# Patient Record
Sex: FEMALE | Race: WHITE | NOT HISPANIC OR LATINO
[De-identification: names, ages, dates, MRNs, and addresses within clinical notes are randomized per-mention and may not be internally consistent; named-entity substitution may affect disease eponyms.]

---

## 2019-06-10 ENCOUNTER — RECORD ABSTRACTING (OUTPATIENT)
Age: 54
End: 2019-06-10

## 2019-06-10 DIAGNOSIS — Z80.52 FAMILY HISTORY OF MALIGNANT NEOPLASM OF BLADDER: ICD-10-CM

## 2019-06-10 DIAGNOSIS — Z78.9 OTHER SPECIFIED HEALTH STATUS: ICD-10-CM

## 2019-06-10 DIAGNOSIS — Z85.528 PERSONAL HISTORY OF OTHER MALIGNANT NEOPLASM OF KIDNEY: ICD-10-CM

## 2019-06-10 DIAGNOSIS — Z83.3 FAMILY HISTORY OF DIABETES MELLITUS: ICD-10-CM

## 2019-06-10 DIAGNOSIS — E04.9 NONTOXIC GOITER, UNSPECIFIED: ICD-10-CM

## 2019-06-10 DIAGNOSIS — Z86.39 PERSONAL HISTORY OF OTHER ENDOCRINE, NUTRITIONAL AND METABOLIC DISEASE: ICD-10-CM

## 2019-06-10 DIAGNOSIS — R63.5 ABNORMAL WEIGHT GAIN: ICD-10-CM

## 2019-06-10 PROBLEM — Z00.00 ENCOUNTER FOR PREVENTIVE HEALTH EXAMINATION: Status: ACTIVE | Noted: 2019-06-10

## 2019-06-10 RX ORDER — LEVOTHYROXINE SODIUM 112 UG/1
112 TABLET ORAL DAILY
Refills: 0 | Status: ACTIVE | COMMUNITY

## 2019-07-22 ENCOUNTER — APPOINTMENT (OUTPATIENT)
Dept: NEPHROLOGY | Facility: CLINIC | Age: 54
End: 2019-07-22
Payer: COMMERCIAL

## 2019-07-22 VITALS
RESPIRATION RATE: 16 BRPM | HEART RATE: 96 BPM | HEIGHT: 65 IN | BODY MASS INDEX: 26.33 KG/M2 | WEIGHT: 158 LBS | SYSTOLIC BLOOD PRESSURE: 118 MMHG | DIASTOLIC BLOOD PRESSURE: 70 MMHG | OXYGEN SATURATION: 98 %

## 2019-07-22 DIAGNOSIS — N18.1 CHRONIC KIDNEY DISEASE, STAGE 1: ICD-10-CM

## 2019-07-22 DIAGNOSIS — E78.5 HYPERLIPIDEMIA, UNSPECIFIED: ICD-10-CM

## 2019-07-22 DIAGNOSIS — E03.9 HYPOTHYROIDISM, UNSPECIFIED: ICD-10-CM

## 2019-07-22 DIAGNOSIS — E55.9 VITAMIN D DEFICIENCY, UNSPECIFIED: ICD-10-CM

## 2019-07-22 DIAGNOSIS — E66.9 OBESITY, UNSPECIFIED: ICD-10-CM

## 2019-07-22 PROCEDURE — 99214 OFFICE O/P EST MOD 30 MIN: CPT

## 2019-07-22 RX ORDER — ERGOCALCIFEROL 1.25 MG/1
1.25 MG CAPSULE, LIQUID FILLED ORAL
Refills: 0 | Status: DISCONTINUED | COMMUNITY
End: 2019-07-22

## 2019-07-22 NOTE — PHYSICAL EXAM
[General Appearance - In No Acute Distress] : in no acute distress [General Appearance - Alert] : alert [Sclera] : the sclera and conjunctiva were normal [Extraocular Movements] : extraocular movements were intact [Neck Appearance] : the appearance of the neck was normal [] : no respiratory distress [Respiration, Rhythm And Depth] : normal respiratory rhythm and effort [Exaggerated Use Of Accessory Muscles For Inspiration] : no accessory muscle use [Auscultation Breath Sounds / Voice Sounds] : lungs were clear to auscultation bilaterally [Heart Rate And Rhythm] : heart rate was normal and rhythm regular [Heart Sounds] : normal S1 and S2 [Heart Sounds Gallop] : no gallops [Murmurs] : no murmurs [Heart Sounds Pericardial Friction Rub] : no pericardial rub [Edema] : there was no peripheral edema [Bowel Sounds] : normal bowel sounds [Abdomen Soft] : soft [Abnormal Walk] : normal gait [Abdomen Tenderness] : non-tender [Involuntary Movements] : no involuntary movements were seen [Skin Color & Pigmentation] : normal skin color and pigmentation [No Focal Deficits] : no focal deficits [Oriented To Time, Place, And Person] : oriented to person, place, and time [Impaired Insight] : insight and judgment were intact [Affect] : the affect was normal [Mood] : the mood was normal [FreeTextEntry1] : thyroid enlarged

## 2019-07-22 NOTE — CONSULT LETTER
[Dear  ___] : Dear  [unfilled], [Consult Letter:] : I had the pleasure of evaluating your patient, [unfilled]. [Consult Closing:] : Thank you very much for allowing me to participate in the care of this patient.  If you have any questions, please do not hesitate to contact me. [Please see my note below.] : Please see my note below.

## 2019-07-22 NOTE — ASSESSMENT
[FreeTextEntry1] : Mary Kate Mendez is a 54-year-old,  female who who underwent a radical left nephrectomy on 12/10/2018 for a renal mass and was then diagnosed with an unclassified left renal cell carcinoma. She is hypothyroid and has recurrent urinary tract infections, the latter of which she has been able to control by avoiding baths and taking Uva Ursi when she becomes symptomatic. Her recent BUN/creatinine trend is as follows: 15/0.98 (12/11/2018), 9/0.91 (12/18/2018), and most recently 15/0.97 (04/24/2019).  Her thyroid function tests are normal on the current dose of Synthroid.    She has dyslipidemia (total cholesterol 253 mg/dL, ..6 mg/dL, HDL 75 mg/dL,  mg/dL) and has stated that she will not take a statin. \par \par IMPRESSION:\par \par (1) Solitary kidney\par (2) Normal renal function.\par (3) Overweight. \par (4) Controlled hypothyroidism.  Seeing Dr. Chante Porras.\par (5) Dyslipidemia.\par \par RECOMMENDATIONS:\par \par (1) We talked about ways to protect the solitary kidney:\par - Good blood pressure control\par -Avoid the use of NSAIDS (ibuprofen, Motrin, Aleve, etc.).  Okay to use Tylenol.\par -Avoid salt 'like the plague'.  Limit sodium intake.  Do not add salt to your food.\par -Avoid high animal protein diets.\par -Stay well hydrated.\par -Good lipid control.\par \par (2) Dyslipidemia \par -Weight loss\par -Decrease animal products in the diet\par -Regular exercise\par \par (3) Hypothyroidism\par -I will defer to Dr. Porras on this issue.\par

## 2019-07-22 NOTE — HISTORY OF PRESENT ILLNESS
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 Say Ramsay in\[place] for her primary care issues. She currently does not have a primary care physician\par though has followed with Dr. Vick Sparks of urology for a left renal mass which was ultimately\par noted to be an unclassified renal cell carcinoma following a robotic laparoscopic radicall left\par nephrectomy on 12/10/2018. She has been told that she needs to see a local nephrologist.  I first saw Ms. Mendez on 03/26/2019.  She is here for follow up.\par \par Ms. Mendez has been doing well.  She has not yet chosen a PCP though is considering seeing Dr. Wood. Her 6-month post left nephrectomy CT scan was normal (except for a missing kidney).  She has lost around 10 pounds.  She recently went to [place] on vacation.  She hurt her knee during the trip and so has not been walking much recently. \par

## 2019-07-22 NOTE — REVIEW OF SYSTEMS
[Feeling Tired] : feeling tired [Loss Of Hearing] : hearing loss [Easy Bruising] : a tendency for easy bruising [Negative] : Endocrine [de-identified] : "I;m fair and I'm clumsy"

## 2020-07-21 ENCOUNTER — APPOINTMENT (OUTPATIENT)
Dept: NEPHROLOGY | Facility: CLINIC | Age: 55
End: 2020-07-21